# Patient Record
Sex: FEMALE
[De-identification: names, ages, dates, MRNs, and addresses within clinical notes are randomized per-mention and may not be internally consistent; named-entity substitution may affect disease eponyms.]

---

## 2019-01-04 ENCOUNTER — HOSPITAL ENCOUNTER (INPATIENT)
Dept: HOSPITAL 92 - L&D/OP | Age: 34
LOS: 1 days | Discharge: HOME | End: 2019-01-05
Attending: OBSTETRICS & GYNECOLOGY | Admitting: OBSTETRICS & GYNECOLOGY
Payer: MEDICAID

## 2019-01-04 VITALS — BODY MASS INDEX: 30.9 KG/M2

## 2019-01-04 DIAGNOSIS — O34.219: Primary | ICD-10-CM

## 2019-01-04 DIAGNOSIS — Z3A.39: ICD-10-CM

## 2019-01-04 LAB
A1 MICROGLOB PLACENTAL VAG QL: (no result)
AMNISURE INTERNAL CONTROL QC: (no result)
HBSAG INDEX: 0.16 S/CO (ref 0–0.99)
HGB BLD-MCNC: 12.4 G/DL (ref 12–16)
HIV 1/2 INDEX: 0.08 S/CO (ref ?–1)
MCH RBC QN AUTO: 29.4 PG (ref 27–31)
MCV RBC AUTO: 85.1 FL (ref 78–98)
PLATELET # BLD AUTO: 217 THOU/UL (ref 130–400)
RBC # BLD AUTO: 4.21 MILL/UL (ref 4.2–5.4)
SYPHILIS ANTIBODY INDEX: 0.03 S/CO
WBC # BLD AUTO: 10.9 THOU/UL (ref 4.8–10.8)

## 2019-01-04 PROCEDURE — 87389 HIV-1 AG W/HIV-1&-2 AB AG IA: CPT

## 2019-01-04 PROCEDURE — 10907ZC DRAINAGE OF AMNIOTIC FLUID, THERAPEUTIC FROM PRODUCTS OF CONCEPTION, VIA NATURAL OR ARTIFICIAL OPENING: ICD-10-PCS | Performed by: OBSTETRICS & GYNECOLOGY

## 2019-01-04 PROCEDURE — 84112 EVAL AMNIOTIC FLUID PROTEIN: CPT

## 2019-01-04 PROCEDURE — 85027 COMPLETE CBC AUTOMATED: CPT

## 2019-01-04 PROCEDURE — 36415 COLL VENOUS BLD VENIPUNCTURE: CPT

## 2019-01-04 PROCEDURE — 86901 BLOOD TYPING SEROLOGIC RH(D): CPT

## 2019-01-04 PROCEDURE — 51702 INSERT TEMP BLADDER CATH: CPT

## 2019-01-04 PROCEDURE — 86780 TREPONEMA PALLIDUM: CPT

## 2019-01-04 PROCEDURE — 99285 EMERGENCY DEPT VISIT HI MDM: CPT

## 2019-01-04 PROCEDURE — 87340 HEPATITIS B SURFACE AG IA: CPT

## 2019-01-04 PROCEDURE — 86900 BLOOD TYPING SEROLOGIC ABO: CPT

## 2019-01-04 PROCEDURE — 90715 TDAP VACCINE 7 YRS/> IM: CPT

## 2019-01-04 PROCEDURE — 86850 RBC ANTIBODY SCREEN: CPT

## 2019-01-04 PROCEDURE — 86762 RUBELLA ANTIBODY: CPT

## 2019-01-04 RX ADMIN — Medication SCH: at 15:51

## 2019-01-04 RX ADMIN — DOCUSATE CALCIUM SCH MG: 240 CAPSULE, LIQUID FILLED ORAL at 22:32

## 2019-01-04 RX ADMIN — Medication SCH: at 22:32

## 2019-01-04 NOTE — PDOC.OPDEL
OB Operative/Delivery Note


Delivery Dr/Surgeon: HANY Wen ( Resident, Ob track); Jason (Faculty)


Assist: Gomez (Dr recinos was not available for delivery)


Pre-Delivery Diagnosis: active labor, other (TOLAC, HX successful  x1)


Procedure/Post Delivery Dx: spontaneous vaginal delivery (TOLAC)


Anesthesia: epidural





- Findings


  ** A


Sex: female


Apgar - 1 min: 9


Apgar - 5 min: 9





- Additional Findings/Plan


Placenta delivered: spontaneous ( female at 1038)


Estimated blood loss: 300


Compilations/Other Findings: 





Vigorous , no NC


3vc, placenta intact


Plac at 1042...chuck mechanism


All counts correct


No lacs


No repair





QBL pending


Post delivery plan: routine recovery

## 2019-01-05 VITALS — TEMPERATURE: 98 F | DIASTOLIC BLOOD PRESSURE: 49 MMHG | SYSTOLIC BLOOD PRESSURE: 99 MMHG

## 2019-01-05 LAB
HGB BLD-MCNC: 11.7 G/DL (ref 12–16)
MCH RBC QN AUTO: 29.2 PG (ref 27–31)
MCV RBC AUTO: 87 FL (ref 78–98)
PLATELET # BLD AUTO: 204 THOU/UL (ref 130–400)
RBC # BLD AUTO: 4 MILL/UL (ref 4.2–5.4)
WBC # BLD AUTO: 11.3 THOU/UL (ref 4.8–10.8)

## 2019-01-05 RX ADMIN — Medication SCH: at 09:50

## 2019-01-05 RX ADMIN — DOCUSATE CALCIUM SCH MG: 240 CAPSULE, LIQUID FILLED ORAL at 09:53

## 2019-01-05 NOTE — PDOC.PP
Post Partum Progress Note


Post Partum Day #: 1


Subjective: 





Sleeping, doing well. She awoke as I was entering the room


PO intake tolerated: yes


Flatus: yes


Ambulation: yes


 Vital Signs (12 hours)











  Temp Pulse Resp BP BP Pulse Ox


 


 19 23:10  98.4 F  69  16   98/55 L 


 


 19 20:10  98.2 F  89  20  105/53 L   97


 


 19 17:10  98.8 F  86  16  96/49 L   96


 


 19 13:30  98.1 F  88  20  98/53 L   95








 Weight











Weight                         180 lb

















- Physical Examination


General: NAD


Cardiovascular: no m/r/g


Respiratory: clear to auscultation bilaterally


Abdominal: + bowel sounds, lochia, no distention, appropriately TTP


Extremities: negative homans (B)


Neurological: no gross focal deficits


Psychiatric: A&Ox3, normal affect


Result Diagrams: 


 19 03:14





Additional Labs: 


 Post Partum Labs











Blood Type  O POSITIVE   19  03:15    


 


Hep Bs Antigen  Non-Reactive S/CO (NonReactive)   19  03:14    











(1) , delivered


Code(s): O34.219 - MATERNAL CARE FOR UNSP TYPE SCAR FROM PREVIOUS  DEL 

  Status: Acute   





- Assessment/Plan





Plan:  on 19...doing well.


I offered the patient dsch on PPD 1 this AM or PPD2 tomorrow.


As the other children are with her in the room, she has elected for dsch later 

today which is PPD1


I have written for motrin prn


F/U with Matias in 2-4 weeks

## 2019-09-08 ENCOUNTER — HOSPITAL ENCOUNTER (EMERGENCY)
Dept: HOSPITAL 92 - ERS | Age: 34
Discharge: HOME | End: 2019-09-08
Payer: MEDICAID
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
## 2019-09-08 DIAGNOSIS — S93.401A: Primary | ICD-10-CM

## 2019-09-08 NOTE — RAD
EXAM: 3 views of the right ankle



HISTORY: Ankle pain after injury



COMPARISON: None



FINDINGS: 3 views of the right ankle shows no evidence of acute fracture or dislocation. Mild soft ti
ssue swelling is seen. No degenerative changes are present.



IMPRESSION: No evidence of acute osseous abnormality.



Reported By: Gualberto Burt 

Electronically Signed:  9/8/2019 8:51 PM

## 2020-06-08 ENCOUNTER — HOSPITAL ENCOUNTER (OUTPATIENT)
Dept: HOSPITAL 92 - BICULT | Age: 35
Discharge: HOME | End: 2020-06-08
Attending: OBSTETRICS & GYNECOLOGY
Payer: MEDICAID

## 2020-06-08 DIAGNOSIS — Z3A.20: ICD-10-CM

## 2020-06-08 DIAGNOSIS — O09.522: Primary | ICD-10-CM

## 2020-06-08 PROCEDURE — 76805 OB US >/= 14 WKS SNGL FETUS: CPT

## 2020-06-08 NOTE — ULT
OBSTETRICAL ULTRASOUND:

 

DATE: 6/8/2020.

 

COMPARISON: 

None.

 

HISTORY: 

Evaluate fetal anatomy, a 34-year-old pregnant female.

 

TECHNIQUE: 

Multiplanar, gray scale sonographic imaging of the gravid uterus obtained.

 

FINDINGS: 

Cervical length is approximately 3.4 cm.  Placenta located posteriorly with placental tip approximate
ly 2.8 cm from the internal os.

 

There is a vertex fetal presentation.  Intracranial contents appear unremarkable as does the fetal st
omach.  Fetal heart rate is 147 b.p.m.  Fetal kidneys, urinary bladder, and umbilical cord appear ronak
ssly unremarkable.  Umbilical cord insertion site and fetal spine appear within normal limits.  Three
-vessel cord is documented.  Amniotic fluid index is 12.4 cm.  Four-chamber heart view unremarkable. 
 

 

Fetal Biometry:

BPD  4.5 cm, 19 weeks 4 days

HC  18.1 cm, 20 weeks 4 days

AC  15.3 cm, 20 weeks 4 days

FL   3.3 cm, 20 weeks 1 day

 

Average age based on ultrasound is 20 weeks 2 days.  Estimated fetal weight is 345 gm +/- 50 grams.  
Estimated date of delivery based on ultrasound is 10/24/2020.

 

IMPRESSION: 

Intrauterine gestation as detailed above.

 

POS: MELA

## 2020-10-22 ENCOUNTER — HOSPITAL ENCOUNTER (INPATIENT)
Dept: HOSPITAL 92 - L&D/OP | Age: 35
LOS: 2 days | Discharge: HOME | End: 2020-10-24
Attending: OBSTETRICS & GYNECOLOGY | Admitting: OBSTETRICS & GYNECOLOGY
Payer: MEDICAID

## 2020-10-22 VITALS — BODY MASS INDEX: 30.2 KG/M2

## 2020-10-22 DIAGNOSIS — Z3A.39: ICD-10-CM

## 2020-10-22 DIAGNOSIS — O34.211: ICD-10-CM

## 2020-10-22 DIAGNOSIS — Z20.828: ICD-10-CM

## 2020-10-22 PROCEDURE — 86850 RBC ANTIBODY SCREEN: CPT

## 2020-10-22 PROCEDURE — 99285 EMERGENCY DEPT VISIT HI MDM: CPT

## 2020-10-22 PROCEDURE — 86780 TREPONEMA PALLIDUM: CPT

## 2020-10-22 PROCEDURE — 86900 BLOOD TYPING SEROLOGIC ABO: CPT

## 2020-10-22 PROCEDURE — 86901 BLOOD TYPING SEROLOGIC RH(D): CPT

## 2020-10-22 PROCEDURE — 87340 HEPATITIS B SURFACE AG IA: CPT

## 2020-10-22 PROCEDURE — 87635 SARS-COV-2 COVID-19 AMP PRB: CPT

## 2020-10-22 PROCEDURE — U0003 INFECTIOUS AGENT DETECTION BY NUCLEIC ACID (DNA OR RNA); SEVERE ACUTE RESPIRATORY SYNDROME CORONAVIRUS 2 (SARS-COV-2) (CORONAVIRUS DISEASE [COVID-19]), AMPLIFIED PROBE TECHNIQUE, MAKING USE OF HIGH THROUGHPUT TECHNOLOGIES AS DESCRIBED BY CMS-2020-01-R: HCPCS

## 2020-10-22 PROCEDURE — 85027 COMPLETE CBC AUTOMATED: CPT

## 2020-10-23 LAB
HBSAG INDEX: 0.16 S/CO (ref 0–0.99)
HGB BLD-MCNC: 11.1 G/DL (ref 12–16)
MCH RBC QN AUTO: 26.7 PG (ref 27–31)
MCV RBC AUTO: 79.9 FL (ref 78–98)
PLATELET # BLD AUTO: 294 THOU/UL (ref 130–400)
RBC # BLD AUTO: 4.15 MILL/UL (ref 4.2–5.4)
SYPHILIS ANTIBODY INDEX: 0.02 S/CO
WBC # BLD AUTO: 11.4 THOU/UL (ref 4.8–10.8)

## 2020-10-23 PROCEDURE — 10907ZC DRAINAGE OF AMNIOTIC FLUID, THERAPEUTIC FROM PRODUCTS OF CONCEPTION, VIA NATURAL OR ARTIFICIAL OPENING: ICD-10-PCS | Performed by: OBSTETRICS & GYNECOLOGY

## 2020-10-23 RX ADMIN — Medication SCH: at 22:09

## 2020-10-23 RX ADMIN — HYDROCODONE BITARTRATE AND ACETAMINOPHEN PRN TAB: 5; 325 TABLET ORAL at 20:38

## 2020-10-23 RX ADMIN — Medication PRN MLS: at 01:44

## 2020-10-23 RX ADMIN — Medication PRN MLS: at 00:24

## 2020-10-23 RX ADMIN — HYDROCODONE BITARTRATE AND ACETAMINOPHEN PRN TAB: 5; 325 TABLET ORAL at 09:36

## 2020-10-23 RX ADMIN — DOCUSATE CALCIUM SCH MG: 240 CAPSULE, LIQUID FILLED ORAL at 09:36

## 2020-10-23 RX ADMIN — DOCUSATE CALCIUM SCH MG: 240 CAPSULE, LIQUID FILLED ORAL at 20:37

## 2020-10-23 RX ADMIN — Medication SCH: at 09:40

## 2020-10-24 VITALS — TEMPERATURE: 97.9 F | SYSTOLIC BLOOD PRESSURE: 110 MMHG | DIASTOLIC BLOOD PRESSURE: 56 MMHG

## 2020-10-24 RX ADMIN — Medication SCH: at 08:49

## 2020-10-24 RX ADMIN — DOCUSATE CALCIUM SCH MG: 240 CAPSULE, LIQUID FILLED ORAL at 08:47

## 2023-04-02 ENCOUNTER — HOSPITAL ENCOUNTER (EMERGENCY)
Dept: HOSPITAL 92 - ERS | Age: 38
Discharge: HOME | End: 2023-04-02
Payer: SELF-PAY

## 2023-04-02 DIAGNOSIS — R42: Primary | ICD-10-CM

## 2023-04-02 LAB
ALBUMIN SERPL BCG-MCNC: 4.7 G/DL (ref 3.5–5)
ALP SERPL-CCNC: 76 U/L (ref 40–110)
ALT SERPL W P-5'-P-CCNC: 15 U/L (ref 8–55)
ANION GAP SERPL CALC-SCNC: 12 MMOL/L (ref 10–20)
AST SERPL-CCNC: 14 U/L (ref 5–34)
BASOPHILS # BLD AUTO: 0 THOU/UL (ref 0–0.2)
BASOPHILS NFR BLD AUTO: 0.2 % (ref 0–1)
BILIRUB SERPL-MCNC: 0.5 MG/DL (ref 0.2–1.2)
BUN SERPL-MCNC: 19 MG/DL (ref 7–18.7)
CALCIUM SERPL-MCNC: 9.3 MG/DL (ref 7.8–10.44)
CHLORIDE SERPL-SCNC: 108 MMOL/L (ref 98–107)
CO2 SERPL-SCNC: 22 MMOL/L (ref 22–29)
CREAT CL PREDICTED SERPL C-G-VRATE: 0 ML/MIN (ref 70–130)
EOSINOPHIL # BLD AUTO: 0.1 THOU/UL (ref 0–0.7)
EOSINOPHIL NFR BLD AUTO: 0.8 % (ref 0–10)
GLOBULIN SER CALC-MCNC: 3 G/DL (ref 2.4–3.5)
GLUCOSE SERPL-MCNC: 111 MG/DL (ref 70–105)
HGB BLD-MCNC: 13.5 G/DL (ref 12–16)
LIPASE SERPL-CCNC: 12 U/L (ref 8–78)
LYMPHOCYTES # BLD: 1.7 THOU/UL (ref 1.2–3.4)
LYMPHOCYTES NFR BLD AUTO: 17.2 % (ref 21–51)
MCH RBC QN AUTO: 31.4 PG (ref 27–31)
MCV RBC AUTO: 90.2 FL (ref 78–98)
MONOCYTES # BLD AUTO: 0.3 THOU/UL (ref 0.11–0.59)
MONOCYTES NFR BLD AUTO: 3 % (ref 0–10)
NEUTROPHILS # BLD AUTO: 7.9 THOU/UL (ref 1.4–6.5)
NEUTROPHILS NFR BLD AUTO: 78.8 % (ref 42–75)
PLATELET # BLD AUTO: 278 10X3/UL (ref 130–400)
POTASSIUM SERPL-SCNC: 4 MMOL/L (ref 3.5–5.1)
PREGS CONTROL BACKGROUND?: (no result)
PREGS CONTROL BAR APPEAR?: YES
RBC # BLD AUTO: 4.29 MILL/UL (ref 4.2–5.4)
SODIUM SERPL-SCNC: 138 MMOL/L (ref 136–145)
WBC # BLD AUTO: 10 10X3/UL (ref 4.8–10.8)

## 2023-04-02 PROCEDURE — 83690 ASSAY OF LIPASE: CPT

## 2023-04-02 PROCEDURE — 84703 CHORIONIC GONADOTROPIN ASSAY: CPT

## 2023-04-02 PROCEDURE — 93005 ELECTROCARDIOGRAM TRACING: CPT

## 2023-04-02 PROCEDURE — 96375 TX/PRO/DX INJ NEW DRUG ADDON: CPT

## 2023-04-02 PROCEDURE — 85025 COMPLETE CBC W/AUTO DIFF WBC: CPT

## 2023-04-02 PROCEDURE — 96374 THER/PROPH/DIAG INJ IV PUSH: CPT

## 2023-04-02 PROCEDURE — 36415 COLL VENOUS BLD VENIPUNCTURE: CPT

## 2023-04-02 PROCEDURE — 80053 COMPREHEN METABOLIC PANEL: CPT
